# Patient Record
Sex: MALE | Race: ASIAN | NOT HISPANIC OR LATINO | Employment: FULL TIME | URBAN - METROPOLITAN AREA
[De-identification: names, ages, dates, MRNs, and addresses within clinical notes are randomized per-mention and may not be internally consistent; named-entity substitution may affect disease eponyms.]

---

## 2017-01-09 ENCOUNTER — OFFICE VISIT (OUTPATIENT)
Dept: MEDICAL GROUP | Facility: MEDICAL CENTER | Age: 47
End: 2017-01-09
Payer: COMMERCIAL

## 2017-01-09 VITALS
HEART RATE: 83 BPM | SYSTOLIC BLOOD PRESSURE: 108 MMHG | DIASTOLIC BLOOD PRESSURE: 70 MMHG | OXYGEN SATURATION: 96 % | BODY MASS INDEX: 29.77 KG/M2 | TEMPERATURE: 98.2 F | WEIGHT: 168 LBS | HEIGHT: 63 IN

## 2017-01-09 DIAGNOSIS — E78.5 DYSLIPIDEMIA: Chronic | ICD-10-CM

## 2017-01-09 DIAGNOSIS — Z00.00 PREVENTATIVE HEALTH CARE: Chronic | ICD-10-CM

## 2017-01-09 PROCEDURE — 99396 PREV VISIT EST AGE 40-64: CPT | Performed by: INTERNAL MEDICINE

## 2017-01-09 ASSESSMENT — ENCOUNTER SYMPTOMS
DOUBLE VISION: 0
ABDOMINAL PAIN: 0
DIARRHEA: 0
CONSTIPATION: 0
DEPRESSION: 0
HEADACHES: 0
WEIGHT LOSS: 0
SHORTNESS OF BREATH: 0
INSOMNIA: 0
COUGH: 0
MYALGIAS: 0
BLURRED VISION: 0
PALPITATIONS: 0
HEARTBURN: 0
NECK PAIN: 0
DIZZINESS: 0

## 2017-01-09 ASSESSMENT — PATIENT HEALTH QUESTIONNAIRE - PHQ9: CLINICAL INTERPRETATION OF PHQ2 SCORE: 0

## 2017-01-09 NOTE — PROGRESS NOTES
Subjective:      Olive Easley is a 46 y.o. male who presents with annual        HPI   Here for annual exam   No meds not taking vitamin D regularly  Works at [employer] full time  Glasses for reading and distance  Teeth cleaning every six months  No hearing changes  SH soda occasional, coffee two cups per day, one job, , one healthy boy age 18, exercises every other day weight, has been eating more fried foods in the holidays, tries to avoid sweets, does eat bread and white rice  FH no changes, one brother and five sisters healthy  No mood changes      Current Outpatient Prescriptions   Medication Sig Dispense Refill   • naproxen (NAPROSYN) 500 MG Tab Take 1 Tab by mouth 2 times a day as needed (pain). 60 Tab 5   • tizanidine (ZANAFLEX) 4 MG Tab Take 1 Tab by mouth 3 times a day as needed. 30 Tab 2   • ergocalciferol (DRISDOL) 21054 UNIT capsule Take 1 Cap by mouth every 7 days. 12 Cap 6     No current facility-administered medications for this visit.     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       Abnormal liver enzymes  11/10 AST 43,ALT 74  11/10 AST 75,,iron 86,% sat 29,ferritin 308,hep panel negative, normal bili and alk phos  12/10 ultrasound liver fatty infiltration  4/11 AST 23,ALT 42  8/5/13 AST 20,ALT 29  8/22/14 AST 47,ALT 97,alk phos 103,bili 0.7,globulin 3.9  8/21/15 AST 26,ALT 57,,alk phos 125,bili 0.6,iron 65,%sat 18,ferritin 166,hep panel acute  Negative,GILSON, AAT, AMA, F-actin Ab IgG negative    Dyslipidemia  11/10 chol 199,trig 130,hdl 49,ldl 124  6/12 chol 174,trig 134,hdl 46,ldl 101  9/12 chol 163,trig 57,hdl 49,ldl 103    4/13 chol 148,trig 118,hdl 44,ldl 80  5/30/14 chol 175,trig 96,hdl 48,ldl  108  8/22/14 chol 178,trig 55,hdl 46,ldl 121  2/27/15 chol 172,trig 189,hdl 45,ldl 89  8/21/15 chol 211,trig 137,hdl 50,ldl 134    Allergic rhinitis      History anxiety    History IBS    Preventative health  6/25/12 tdap  8/21/15 vit d 19 start 79223 weekly    Status post volvulus surgery as a child      Review of Systems   Constitutional: Negative for weight loss.   Eyes: Negative for blurred vision and double vision.   Respiratory: Negative for cough and shortness of breath.    Cardiovascular: Negative for chest pain and palpitations.   Gastrointestinal: Negative for heartburn, abdominal pain, diarrhea and constipation.        Occasional hemorrhoids   Genitourinary: Negative for frequency.        Nocturia x 1     Musculoskeletal: Negative for myalgias, joint pain and neck pain.   Neurological: Negative for dizziness and headaches.   Endo/Heme/Allergies: Negative for environmental allergies.   Psychiatric/Behavioral: Negative for depression. The patient does not have insomnia.           Objective:          Physical Exam   Constitutional: He appears well-developed and well-nourished. No distress.   HENT:   Head: Normocephalic and atraumatic.   Right Ear: External ear normal.   Left Ear: External ear normal.   Mouth/Throat: Oropharynx is clear and moist.   Eyes: Conjunctivae are normal. Right eye exhibits no discharge. Left eye exhibits no discharge.   Neck: Neck supple. No JVD present. No thyromegaly present.   Cardiovascular: Normal rate, regular rhythm and normal heart sounds.  Exam reveals no friction rub.    No murmur heard.  Pulmonary/Chest: Effort normal and breath sounds normal. No respiratory distress. He has no wheezes.   Abdominal: Soft. Bowel sounds are normal. He exhibits no distension. There is no guarding.   Musculoskeletal: He exhibits no edema.   Neurological: He is alert.   Skin: Skin is warm. He is not diaphoretic.   Psychiatric: He has a normal mood and affect. His behavior is normal.   Nursing  note and vitals reviewed.              Assessment/Plan:     Assessment  #! Annual     #2 Allergic rhinitis  in April    #3 Preventative health  6/25/12 tdap  8/21/15 vit d 19 start 25484 weekly    #4 Status post volvulus surgery as a child    #5 Hemorrhoids     #6 dyslipidemia  8/21/15 chol 211,trig 137,hdl 50,ldl 134    #7 history of elevated liver enzymes no alcohol  8/21/15 AST 26,ALT 57,,alk phos 125,bili 0.6,iron 65,%sat 18,ferritin 166,hep panel acute  Negative,GILSON, AAT, AMA, F-actin Ab IgG negative      Plan  #1 recheck labs    #2 take vitamin D weekly    #3 increase fiber intake for fractures, vegetables, oatmeal, brown rice, quinoa, couscous    #4 Hytone as needed for itching or hemorrhoids    #5 continue no tobacco, no alcohol    #6 exercise daily    #7 influenza vaccine at [facility]    #8 if he does not hear from us 2 days after getting blood test done at [place] lab, he will call us

## 2017-01-09 NOTE — MR AVS SNAPSHOT
"Olive Traore Tracee   2017 11:00 AM   Office Visit   MRN: [id number]    Department:  [facility]   Dept Phone:  [phone]    Description:  Male : 1970   Provider:  Ajit Bueno M.D.           Allergies as of 2017     No Known Allergies      You were diagnosed with     Preventative health care   [813771]       Dyslipidemia   [302647]         Vital Signs     Blood Pressure Pulse Temperature Height Weight Body Mass Index    108/70 mmHg 83 36.8 °C (98.2 °F) 1.6 m (5' 3\") 76.204 kg (168 lb) 29.77 kg/m2    Oxygen Saturation Smoking Status                96% Never Smoker           Basic Information     Date Of Birth Sex Race Ethnicity Preferred Language    1970 Male  Non- English      Problem List              ICD-10-CM Priority Class Noted - Resolved    Volvulus neonatorum Q43.3   2010 - Present    History of anxiety Z86.59   2010 - Present    Dyslipidemia (Chronic) E78.5   2010 - Present    Preventative health care (Chronic) Z00.00   2010 - Present    IBS (irritable bowel syndrome) K58.9   2012 - Present    Allergic rhinitis J30.9   2014 - Present    Abnormal liver function (Chronic) K76.89   2014 - Present      Health Maintenance        Date Due Completion Dates    IMM INFLUENZA (1) 2016 10/6/2015, 10/6/2014, 10/18/2013, 2012, 2010    IMM DTaP/Tdap/Td Vaccine (2 - Td) 2022            Current Immunizations     Influenza TIV (IM) 10/6/2015, 10/6/2014, 10/18/2013, 2012    Influenza Vaccine Pediatric 2010    Tdap Vaccine 2012      Below and/or attached are the medications your provider expects you to take. Review all of your home medications and newly ordered medications with your provider and/or pharmacist. Follow medication instructions as directed by your provider and/or pharmacist. Please keep your medication list with you and share with your provider. Update the " information when medications are discontinued, doses are changed, or new medications (including over-the-counter products) are added; and carry medication information at all times in the event of emergency situations     Allergies:  No Known Allergies          Medications  Valid as of: January 09, 2017 - 11:25 AM    Generic Name Brand Name Tablet Size Instructions for use    Ergocalciferol (Cap) DRISDOL 41386 UNIT Take 1 Cap by mouth every 7 days.        Hydrocortisone (Cream) hydrocortisone 2.5 % Apply 1 Application to affected area(s) 2 times a day as needed (itching apply to affected area).        .                 Medicines prescribed today were sent to:     [id number] - [place], [place] - [address] AT [facility] [employer] [address]    [address] [place] [address]    Phone: [phone] Fax: [phone]    Open 24 Hours?: No      Medication refill instructions:       If your prescription bottle indicates you have medication refills left, it is not necessary to call your provider’s office. Please contact your pharmacy and they will refill your medication.    If your prescription bottle indicates you do not have any refills left, you may request refills at any time through one of the following ways: The online [employer] system (except Urgent Care), by calling your provider’s office, or by asking your pharmacy to contact your provider’s office with a refill request. Medication refills are processed only during regular business hours and may not be available until the next business day. Your provider may request additional information or to have a follow-up visit with you prior to refilling your medication.   *Please Note: Medication refills are assigned a new Rx number when refilled electronically. Your pharmacy may indicate that no refills were authorized even though a new prescription for the same medication is available at the pharmacy. Please request the medicine by name with the pharmacy before  contacting your provider for a refill.        Your To Do List     Future Labs/Procedures Complete By Expires    CBC WITH DIFFERENTIAL  As directed 1/10/2018    COMP METABOLIC PANEL  As directed 1/10/2018    LIPID PROFILE  As directed 1/10/2018    TESTOSTERONE SERUM  As directed 1/10/2018    TESTOSTERONE,FREE ADULT MALE  As directed 1/10/2018    Comments:    To be run by [facility]    TSH  As directed 1/10/2018    VITAMIN D,25 HYDROXY  As directed 1/10/2018      Other Notes About Your Plan     8/21/15 AST 26,ALT 57,,alk phos 125,bili 0.6,iron 65,%sat 18,ferritin 166,hep panel acute  Negative,GILSON, AAT, AMA, F-actin Ab IgG negative  8/23/15 [facility]  11/15 repeat hepatic panel, vit d, lipid, testosterone                       MyChart Status: Patient Declined

## 2017-01-24 ENCOUNTER — TELEPHONE (OUTPATIENT)
Dept: MEDICAL GROUP | Facility: MEDICAL CENTER | Age: 47
End: 2017-01-24

## 2017-01-24 DIAGNOSIS — R53.83 OTHER FATIGUE: ICD-10-CM

## 2017-01-24 DIAGNOSIS — E78.5 DYSLIPIDEMIA: Chronic | ICD-10-CM

## 2017-01-24 NOTE — TELEPHONE ENCOUNTER
Please call [facility] [phone], have them fax us his final blood test results from 1/23/17, we still have not received his cholesterol panel

## 2017-01-24 NOTE — TELEPHONE ENCOUNTER
Per Leonarda @ [facility], britta.    Spoke  With MaryLee, Most of the Chol. Readings could not be calculated due to High Triglyceride levels (1892).

## 2017-01-25 NOTE — TELEPHONE ENCOUNTER
Notified labs, triglycerides greater than 1800, suspect laboratory error, patient states was fasting although he had a fatty meal the night before, no abdominal pain, no history of pancreatitis  [facility] did not do testosterone, we will reorder again along with fasting lipid panel to repeat, we will mail it to the patient  He understands and agrees

## 2017-01-26 ENCOUNTER — TELEPHONE (OUTPATIENT)
Dept: MEDICAL GROUP | Facility: MEDICAL CENTER | Age: 47
End: 2017-01-26

## 2017-01-26 DIAGNOSIS — Z00.00 PREVENTATIVE HEALTH CARE: Chronic | ICD-10-CM

## 2017-01-27 NOTE — TELEPHONE ENCOUNTER
Please notify patient that the testosterone level from [facility] on January 23 is normal, his level is normal at 625

## 2017-07-10 ENCOUNTER — OFFICE VISIT (OUTPATIENT)
Dept: MEDICAL GROUP | Facility: MEDICAL CENTER | Age: 47
End: 2017-07-10
Payer: COMMERCIAL

## 2017-07-10 VITALS
WEIGHT: 164 LBS | OXYGEN SATURATION: 96 % | TEMPERATURE: 98.5 F | BODY MASS INDEX: 29.06 KG/M2 | HEIGHT: 63 IN | DIASTOLIC BLOOD PRESSURE: 82 MMHG | SYSTOLIC BLOOD PRESSURE: 118 MMHG | HEART RATE: 88 BPM

## 2017-07-10 DIAGNOSIS — E78.5 DYSLIPIDEMIA: Chronic | ICD-10-CM

## 2017-07-10 DIAGNOSIS — R94.5 ABNORMAL LIVER FUNCTION: Chronic | ICD-10-CM

## 2017-07-10 PROCEDURE — 99213 OFFICE O/P EST LOW 20 MIN: CPT | Performed by: INTERNAL MEDICINE

## 2017-07-10 NOTE — PROGRESS NOTES
Subjective:      Olive Easley is a 46 y.o. male who presents with neck pain        HPI   Neck pain last week, right side, went to bed 4th july and next morning woke up and stiff neck right side, no radiation down the arms, no sensory changes, right hand male, worse going to bed, new pillow starting last month and has been having neck symptoms over the last few weeks.  No trauma.  Pain will radiate to the base of his right neck, no color changes, no headache, no vision changes, no difficulty with speech, swallowing, balance.  Not worse with neck movement during the day.  Will mostly bother him when he gets up in the morning .  No previous shoulder or clavicle injury.  No chest pain, no shortness of breath.  No recent overuse of neck, has not been looking up or looking down in an extended fashion at home or at work  Some left shoulder discomfort with pressing movements overhead, no history of rotator cuff disease  Weight training every other day does upper body exercises every other day  No weakness  strength right upper extremity  No mid or low back pain        Current Outpatient Prescriptions   Medication Sig Dispense Refill   • hydrocortisone 2.5 % Cream topical cream Apply 1 Application to affected area(s) 2 times a day as needed (itching apply to affected area). 30 g 3   • ergocalciferol (DRISDOL) 73577 UNIT capsule Take 1 Cap by mouth every 7 days. 12 Cap 6     No current facility-administered medications for this visit.     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         Abnormal liver enzymes  11/10 AST 43,ALT 74  11/10 AST 75,,iron 86,% sat 29,ferritin 308,hep panel negative, normal bili and alk phos  12/10 ultrasound  "liver fatty infiltration  4/11 AST 23,ALT 42  8/5/13 AST 20,ALT 29  8/22/14 AST 47,ALT 97,alk phos 103,bili 0.7,globulin 3.9  8/21/15 AST 26,ALT 57,,alk phos 125,bili 0.6,iron 65,%sat 18,ferritin 166,hep panel acute  Negative,GILSON, AAT, AMA, F-actin Ab IgG negative  1/23/17 AST 38,ALT 55,alk phos 131 (),bili 0.3 done at [facility]    Dyslipidemia  11/10 chol 199,trig 130,hdl 49,ldl 124  6/12 chol 174,trig 134,hdl 46,ldl 101  9/12 chol 163,trig 57,hdl 49,ldl 103    4/13 chol 148,trig 118,hdl 44,ldl 80  5/30/14 chol 175,trig 96,hdl 48,ldl 108  8/22/14 chol 178,trig 55,hdl 46,ldl 121  2/27/15 chol 172,trig 189,hdl 45,ldl 89  8/21/15 chol 211,trig 137,hdl 50,ldl 134  1/23/17 chol 192,trig 1892,hdl 25,ldl not calculated done at [facility]    Allergic rhinitis      History anxiety    History IBS    Preventative health  6/25/12 tdap  8/21/15 vit d 19 start 18439 weekly  1/23/17 testosterone 625    Status post volvulus surgery as a child        ROS       Objective:     /82 mmHg  Pulse 88  Temp(Src) 36.9 °C (98.5 °F)  Ht 1.6 m (5' 3\")  Wt 74.39 kg (164 lb)  BMI 29.06 kg/m2  SpO2 96%     Physical Exam   Constitutional: He appears well-developed and well-nourished. No distress.   HENT:   Head: Normocephalic and atraumatic.   Right Ear: External ear normal.   Left Ear: External ear normal.   Eyes: Conjunctivae are normal. Right eye exhibits no discharge. Left eye exhibits no discharge.   Neck: Neck supple. No JVD present. No thyromegaly present.   Cardiovascular: Normal rate, regular rhythm and normal heart sounds.    Pulmonary/Chest: Effort normal and breath sounds normal. No respiratory distress. He has no wheezes.   Abdominal: He exhibits no distension.   Musculoskeletal: He exhibits tenderness. He exhibits no edema.   Neurological: He is alert.   Skin: Skin is warm. He is not diaphoretic.   Psychiatric: He has a normal mood and affect. His behavior is normal.   Nursing note and vitals reviewed.    No " tenderness to palpation clavicle or glenohumeral joint right shoulder, abduction 120°, extension 150° right shoulder, normal right elbow range of motion, no  strength right upper extremity, no atrophy, normal cervical range of motion, no tenderness sternocleidomastoid right side, no tenderness cervical spine or cervical paraspinal muscles or trapezius right side, no occipital tenderness, no rash, no evidence of shingles, slight tenderness to palpation right pectoralis muscle to palpation          Assessment/Plan:     Assessment   #1 right pectoralis discomfort, question muscle strain, also some neck pain when getting up in the morning, new pillow since last month, possibly this may be a contributing factor, also consider right shoulder bursitis, no trauma, no significant overuse other than weight training, no evidence of cervical radiculopathy    #2 dyslipidemia, has not gotten repeat labs    #3 history of elevated liver enzymes, no alcohol, previous workup negative    Plan  #1 right clavicle and right shoulder x-ray to be done at [facility], order faxed    #2 declines physical therapy for now    #3 repeat labs fasting    #4 no overhead pressing    #5 change pillow    #6 continue no alcohol    #7 declines medication for muscle spasm or pain    #8 will call with results, if he does not hear from us a few days after x-ray or labs, he will call us

## 2017-07-10 NOTE — MR AVS SNAPSHOT
"Olive Traore Tracee   7/10/2017 4:40 PM   Office Visit   MRN: [id number]    Department:  [facility]   Dept Phone:  [phone]    Description:  Male : 1970   Provider:  Ajit Bueno M.D.           Allergies as of 7/10/2017     No Known Allergies      You were diagnosed with     Abnormal liver function   [704128]       Dyslipidemia   [510929]         Vital Signs     Blood Pressure Pulse Temperature Height Weight Body Mass Index    118/82 mmHg 88 36.9 °C (98.5 °F) 1.6 m (5' 3\") 74.39 kg (164 lb) 29.06 kg/m2    Oxygen Saturation Smoking Status                96% Never Smoker           Basic Information     Date Of Birth Sex Race Ethnicity Preferred Language    1970 Male  Non- English      Problem List              ICD-10-CM Priority Class Noted - Resolved    Volvulus neonatorum Q43.3   2010 - Present    History of anxiety Z86.59   2010 - Present    Dyslipidemia (Chronic) E78.5   2010 - Present    Preventative health care (Chronic) Z00.00   2010 - Present    IBS (irritable bowel syndrome) K58.9   2012 - Present    Allergic rhinitis J30.9   2014 - Present    Abnormal liver function (Chronic) K76.89   2014 - Present      Health Maintenance        Date Due Completion Dates    IMM INFLUENZA (1) 2017 10/6/2015, 10/6/2014, 10/18/2013, 2012, 2010    IMM DTaP/Tdap/Td Vaccine (2 - Td) 2022            Current Immunizations     Influenza TIV (IM) 10/6/2015, 10/6/2014, 10/18/2013, 2012    Influenza Vaccine Pediatric 2010    Tdap Vaccine 2012      Below and/or attached are the medications your provider expects you to take. Review all of your home medications and newly ordered medications with your provider and/or pharmacist. Follow medication instructions as directed by your provider and/or pharmacist. Please keep your medication list with you and share with your provider. Update the information " when medications are discontinued, doses are changed, or new medications (including over-the-counter products) are added; and carry medication information at all times in the event of emergency situations     Allergies:  No Known Allergies          Medications  Valid as of: July 10, 2017 -  5:09 PM    Generic Name Brand Name Tablet Size Instructions for use    Ergocalciferol (Cap) DRISDOL 39949 UNIT Take 1 Cap by mouth every 7 days.        Hydrocortisone (Cream) hydrocortisone 2.5 % Apply 1 Application to affected area(s) 2 times a day as needed (itching apply to affected area).        .                 Medicines prescribed today were sent to:     [id number]  [place], [place] - [address] AT [facility] [employer] [facility] [employer]    [address] [place] [address]    Phone: [phone] Fax: [phone]    Open 24 Hours?: No      Medication refill instructions:       If your prescription bottle indicates you have medication refills left, it is not necessary to call your provider’s office. Please contact your pharmacy and they will refill your medication.    If your prescription bottle indicates you do not have any refills left, you may request refills at any time through one of the following ways: The online [employer] system (except Urgent Care), by calling your provider’s office, or by asking your pharmacy to contact your provider’s office with a refill request. Medication refills are processed only during regular business hours and may not be available until the next business day. Your provider may request additional information or to have a follow-up visit with you prior to refilling your medication.   *Please Note: Medication refills are assigned a new Rx number when refilled electronically. Your pharmacy may indicate that no refills were authorized even though a new prescription for the same medication is available at the pharmacy. Please request the medicine by name with the pharmacy before contacting  your provider for a refill.        Your To Do List     Future Labs/Procedures Complete By Expires    COMP METABOLIC PANEL  As directed 7/11/2018    HEMOGLOBIN A1C  As directed 7/11/2018    LIPID PROFILE  As directed 7/11/2018      Other Notes About Your Plan     8/21/15 AST 26,ALT 57,,alk phos 125,bili 0.6,iron 65,%sat 18,ferritin 166,hep panel acute  Negative,GILSON, AAT, AMA, F-actin Ab IgG negative  8/23/15 [facility] ordered                   MyChart Status: Patient Declined

## 2017-07-13 ENCOUNTER — TELEPHONE (OUTPATIENT)
Dept: MEDICAL GROUP | Facility: MEDICAL CENTER | Age: 47
End: 2017-07-13

## 2017-07-13 NOTE — TELEPHONE ENCOUNTER
Please notify patient that his shoulder x-ray at [facility] is negative but that was done of his left shoulder, I did fax an order to [facility] after his visit for the right shoulder and right clavicle, since that is the side that is bothering him.    Have him call [facility], that order for the right shoulder and right clavicle x-ray should be in their system, he can get that done if his shoulder is still bothering him.

## 2017-07-13 NOTE — TELEPHONE ENCOUNTER
----- Message from Ajit Bueno M.D. sent at 7/13/2017 12:49 PM PDT -----  Please notify patient that his shoulder x-ray at [facility] is negative but that was done of his left shoulder, I did fax an order to [facility] after his visit for the right shoulder and right clavicle, since that is the side that is bothering him.    Have him call [facility], that order for the right shoulder and right clavicle x-ray should be in their system, he can get that done if his shoulder is still bothering him.

## 2017-07-14 ENCOUNTER — TELEPHONE (OUTPATIENT)
Dept: MEDICAL GROUP | Facility: MEDICAL CENTER | Age: 47
End: 2017-07-14

## 2018-07-23 ENCOUNTER — OFFICE VISIT (OUTPATIENT)
Dept: MEDICAL GROUP | Facility: MEDICAL CENTER | Age: 48
End: 2018-07-23
Payer: COMMERCIAL

## 2018-07-23 VITALS
DIASTOLIC BLOOD PRESSURE: 78 MMHG | HEIGHT: 63 IN | OXYGEN SATURATION: 96 % | WEIGHT: 167 LBS | TEMPERATURE: 98.4 F | SYSTOLIC BLOOD PRESSURE: 122 MMHG | HEART RATE: 97 BPM | BODY MASS INDEX: 29.59 KG/M2

## 2018-07-23 DIAGNOSIS — R94.5 ABNORMAL LIVER FUNCTION: Chronic | ICD-10-CM

## 2018-07-23 DIAGNOSIS — Z00.00 PREVENTATIVE HEALTH CARE: Primary | Chronic | ICD-10-CM

## 2018-07-23 DIAGNOSIS — F41.9 ANXIETY: ICD-10-CM

## 2018-07-23 DIAGNOSIS — E78.5 DYSLIPIDEMIA: Chronic | ICD-10-CM

## 2018-07-23 DIAGNOSIS — K64.9 HEMORRHOIDS, UNSPECIFIED HEMORRHOID TYPE: ICD-10-CM

## 2018-07-23 DIAGNOSIS — Z86.59 HISTORY OF ANXIETY: ICD-10-CM

## 2018-07-23 DIAGNOSIS — F34.1 DYSTHYMIA: ICD-10-CM

## 2018-07-23 DIAGNOSIS — F32.A DEPRESSION, UNSPECIFIED DEPRESSION TYPE: ICD-10-CM

## 2018-07-23 PROCEDURE — 99396 PREV VISIT EST AGE 40-64: CPT | Performed by: INTERNAL MEDICINE

## 2018-07-23 RX ORDER — SERTRALINE HYDROCHLORIDE 25 MG/1
25 TABLET, FILM COATED ORAL DAILY
Qty: 30 TAB | Refills: 5 | Status: SHIPPED | OUTPATIENT
Start: 2018-07-23 | End: 2019-03-29

## 2018-07-23 ASSESSMENT — PATIENT HEALTH QUESTIONNAIRE - PHQ9
SUM OF ALL RESPONSES TO PHQ QUESTIONS 1-9: 4
CLINICAL INTERPRETATION OF PHQ2 SCORE: 2
5. POOR APPETITE OR OVEREATING: 0 - NOT AT ALL
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
## 2018-11-15 ENCOUNTER — HOSPITAL ENCOUNTER (EMERGENCY)
Dept: HOSPITAL 8 - ED | Age: 48
Discharge: HOME | End: 2018-11-15
Payer: COMMERCIAL

## 2018-11-15 VITALS — HEIGHT: 65 IN | WEIGHT: 145.06 LBS | BODY MASS INDEX: 24.17 KG/M2

## 2018-11-15 VITALS — SYSTOLIC BLOOD PRESSURE: 137 MMHG | DIASTOLIC BLOOD PRESSURE: 93 MMHG
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
## 2018-11-15 DIAGNOSIS — S76.811A: Primary | ICD-10-CM
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
## 2018-11-15 LAB
ALBUMIN SERPL-MCNC: 4.2 G/DL (ref 3.4–5)
ANION GAP SERPL CALC-SCNC: 8 MMOL/L (ref 5–15)
BASOPHILS # BLD AUTO: 0.03 X10^3/UL (ref 0–0.1)
BASOPHILS NFR BLD AUTO: 0 % (ref 0–1)
CALCIUM SERPL-MCNC: 8.5 MG/DL (ref 8.5–10.1)
CHLORIDE SERPL-SCNC: 106 MMOL/L (ref 98–107)
CREAT SERPL-MCNC: 0.82 MG/DL (ref 0.7–1.3)
CULTURE INDICATED?: NO
EOSINOPHIL # BLD AUTO: 0.08 X10^3/UL (ref 0–0.4)
EOSINOPHIL NFR BLD AUTO: 1 % (ref 1–7)
ERYTHROCYTE [DISTWIDTH] IN BLOOD BY AUTOMATED COUNT: 13.9 % (ref 9.4–14.8)
LYMPHOCYTES # BLD AUTO: 2.34 X10^3/UL (ref 1–3.4)
LYMPHOCYTES NFR BLD AUTO: 34 % (ref 22–44)
MCH RBC QN AUTO: 28.7 PG (ref 27.5–34.5)
MCHC RBC AUTO-ENTMCNC: 34.2 G/DL (ref 33.2–36.2)
MCV RBC AUTO: 83.8 FL (ref 81–97)
MD: NO
MICROSCOPIC: (no result)
MONOCYTES # BLD AUTO: 0.47 X10^3/UL (ref 0.2–0.8)
MONOCYTES NFR BLD AUTO: 7 % (ref 2–9)
NEUTROPHILS # BLD AUTO: 3.89 X10^3/UL (ref 1.8–6.8)
NEUTROPHILS NFR BLD AUTO: 57 % (ref 42–75)
PLATELET # BLD AUTO: 308 X10^3/UL (ref 130–400)
PMV BLD AUTO: 7.4 FL (ref 7.4–10.4)
RBC # BLD AUTO: 5.71 X10^6/UL (ref 4.38–5.82)

## 2018-11-15 PROCEDURE — 76857 US EXAM PELVIC LIMITED: CPT

## 2018-11-15 PROCEDURE — 36415 COLL VENOUS BLD VENIPUNCTURE: CPT

## 2018-11-15 PROCEDURE — 80048 BASIC METABOLIC PNL TOTAL CA: CPT

## 2018-11-15 PROCEDURE — 82040 ASSAY OF SERUM ALBUMIN: CPT

## 2018-11-15 PROCEDURE — 85025 COMPLETE CBC W/AUTO DIFF WBC: CPT

## 2018-11-15 PROCEDURE — 81003 URINALYSIS AUTO W/O SCOPE: CPT

## 2018-11-15 PROCEDURE — 99285 EMERGENCY DEPT VISIT HI MDM: CPT

## 2019-03-29 ENCOUNTER — OFFICE VISIT (OUTPATIENT)
Dept: MEDICAL GROUP | Facility: MEDICAL CENTER | Age: 49
End: 2019-03-29
Payer: COMMERCIAL

## 2019-03-29 VITALS
TEMPERATURE: 98.6 F | WEIGHT: 172 LBS | DIASTOLIC BLOOD PRESSURE: 82 MMHG | SYSTOLIC BLOOD PRESSURE: 136 MMHG | OXYGEN SATURATION: 97 % | HEART RATE: 84 BPM | HEIGHT: 63 IN | BODY MASS INDEX: 30.48 KG/M2

## 2019-03-29 DIAGNOSIS — N52.9 ERECTILE DYSFUNCTION, UNSPECIFIED ERECTILE DYSFUNCTION TYPE: ICD-10-CM

## 2019-03-29 DIAGNOSIS — Z00.00 ANNUAL PHYSICAL EXAM: ICD-10-CM

## 2019-03-29 PROBLEM — R68.82 DECREASED SEX DRIVE: Status: ACTIVE | Noted: 2019-03-29

## 2019-03-29 PROCEDURE — 99214 OFFICE O/P EST MOD 30 MIN: CPT | Performed by: FAMILY MEDICINE

## 2019-03-29 RX ORDER — SILDENAFIL 100 MG/1
100 TABLET, FILM COATED ORAL
Qty: 9 TAB | Refills: 5 | Status: SHIPPED | OUTPATIENT
Start: 2019-03-29

## 2019-03-29 NOTE — ASSESSMENT & PLAN NOTE
Started this year, experiencing erectile dysfunction. He is feeling frustrated because of this. Has a younger girlfriend. He has interest in being sexually intimate. Not taking sertraline anymore, controlling mood through regular exercise. No stress right now.  No blood pressure or diabetes problems.  He has tried over-the-counter supplementation without improvement.  He would like to have his testosterone level checked.  Patient has never tried Viagra or Cialis.

## 2019-03-29 NOTE — PROGRESS NOTES
"Subjective:   Olive Easley is a 48 y.o. male here today for erectile dysfunction    ED (erectile dysfunction)  Started this year, experiencing erectile dysfunction. He is feeling frustrated because of this. Has a younger girlfriend. He has interest in being sexually intimate. Not taking sertraline anymore, controlling mood through regular exercise. No stress right now.  No blood pressure or diabetes problems.  He has tried over-the-counter supplementation without improvement.  He would like to have his testosterone level checked.  Patient has never tried Viagra or Cialis.         Current medicines (including changes today)  Current Outpatient Prescriptions   Medication Sig Dispense Refill   • sildenafil citrate (VIAGRA) 100 MG tablet Take 1 Tab by mouth 1 time daily as needed for Erectile Dysfunction. 9 Tab 5   • hydrocortisone 2.5 % Cream topical cream Apply 1 Application to affected area(s) 2 times a day as needed (itching apply to affected area). 30 g 3   • ergocalciferol (DRISDOL) 52116 UNIT capsule Take 1 Cap by mouth every 7 days. 12 Cap 6     No current facility-administered medications for this visit.      He  has no past medical history on file.    ROS   No chest pain, no shortness of breath       Objective:     Blood pressure 136/82, pulse 84, temperature 37 °C (98.6 °F), height 1.6 m (5' 3\"), weight 78 kg (172 lb), SpO2 97 %. Body mass index is 30.47 kg/m².   Physical Exam:  Constitutional: Alert, no distress.  Skin: Warm, dry, good turgor, no rashes in visible areas.  Eye: Equal, round and reactive, conjunctiva clear, lids normal.  Psych: Alert and oriented x3, normal affect and mood.        Assessment and Plan:   The following treatment plan was discussed    1. Erectile dysfunction, unspecified erectile dysfunction type  New problem.  Trial of Viagra.  Check labs and call with results.  - sildenafil citrate (VIAGRA) 100 MG tablet; Take 1 Tab by mouth 1 time daily as needed for Erectile " Dysfunction.  Dispense: 9 Tab; Refill: 5    2. Annual physical exam  - CBC WITH DIFFERENTIAL; Future  - Comp Metabolic Panel; Future  - Lipid Profile; Future  - TSH WITH REFLEX TO FT4; Future  - PROSTATE SPECIFIC AG SCREENING; Future  - TESTOSTERONE SERUM; Future  - VITAMIN D,25 HYDROXY; Future      Followup: Return if symptoms worsen or fail to improve.

## 2020-10-09 ENCOUNTER — OFFICE VISIT (OUTPATIENT)
Dept: MEDICAL GROUP | Facility: MEDICAL CENTER | Age: 50
End: 2020-10-09
Payer: COMMERCIAL

## 2020-10-09 VITALS
WEIGHT: 176 LBS | HEIGHT: 63 IN | DIASTOLIC BLOOD PRESSURE: 84 MMHG | SYSTOLIC BLOOD PRESSURE: 142 MMHG | OXYGEN SATURATION: 94 % | BODY MASS INDEX: 31.18 KG/M2 | HEART RATE: 110 BPM | TEMPERATURE: 96.4 F

## 2020-10-09 DIAGNOSIS — Z23 NEED FOR VACCINATION: ICD-10-CM

## 2020-10-09 DIAGNOSIS — M65.341 TRIGGER RING FINGER OF RIGHT HAND: ICD-10-CM

## 2020-10-09 PROCEDURE — 90471 IMMUNIZATION ADMIN: CPT | Performed by: FAMILY MEDICINE

## 2020-10-09 PROCEDURE — 90686 IIV4 VACC NO PRSV 0.5 ML IM: CPT | Performed by: FAMILY MEDICINE

## 2020-10-09 PROCEDURE — 99213 OFFICE O/P EST LOW 20 MIN: CPT | Mod: 25 | Performed by: FAMILY MEDICINE

## 2023-01-07 NOTE — TELEPHONE ENCOUNTER
Noted I spoke to patient about his results.  We will repeat his blood tests, I will print out the laboratory orders to mail to his home address.  He will get this done at [facility] lab.   0 = swallows foods/liquids without difficulty

## 2023-07-06 NOTE — ASSESSMENT & PLAN NOTE
"Approximately one month ago he started noticing pain to his right, ring finger. It also has been \"locking up\" and getting stuck in the flexed position, worse in the morning. This has never happened before. He is right hand dominant.   "
None known